# Patient Record
Sex: MALE
[De-identification: names, ages, dates, MRNs, and addresses within clinical notes are randomized per-mention and may not be internally consistent; named-entity substitution may affect disease eponyms.]

---

## 2022-11-17 ENCOUNTER — HOSPITAL ENCOUNTER (OUTPATIENT)
Dept: HOSPITAL 97 - ER | Age: 52
Setting detail: OBSERVATION
LOS: 2 days | Discharge: HOME | End: 2022-11-19
Attending: HOSPITALIST | Admitting: HOSPITALIST
Payer: COMMERCIAL

## 2022-11-17 VITALS — BODY MASS INDEX: 57.4 KG/M2

## 2022-11-17 DIAGNOSIS — E66.01: ICD-10-CM

## 2022-11-17 DIAGNOSIS — K60.4: Primary | ICD-10-CM

## 2022-11-17 DIAGNOSIS — Z20.822: ICD-10-CM

## 2022-11-17 DIAGNOSIS — Z88.6: ICD-10-CM

## 2022-11-17 DIAGNOSIS — E11.9: ICD-10-CM

## 2022-11-17 LAB
ALBUMIN SERPL BCP-MCNC: 3.6 G/DL (ref 3.4–5)
ALP SERPL-CCNC: 65 U/L (ref 45–117)
ALT SERPL W P-5'-P-CCNC: 31 U/L (ref 12–78)
AST SERPL W P-5'-P-CCNC: 17 U/L (ref 15–37)
BUN BLD-MCNC: 9 MG/DL (ref 7–18)
GLUCOSE SERPLBLD-MCNC: 100 MG/DL (ref 74–106)
HCT VFR BLD CALC: 42.6 % (ref 39.6–49)
INR BLD: 1.15
LYMPHOCYTES # SPEC AUTO: 1.6 K/UL (ref 0.7–4.9)
MCV RBC: 81.8 FL (ref 80–100)
PMV BLD: 8.7 FL (ref 7.6–11.3)
POTASSIUM SERPL-SCNC: 3.8 MMOL/L (ref 3.5–5.1)
RBC # BLD: 5.2 M/UL (ref 4.33–5.43)

## 2022-11-17 PROCEDURE — 87811 SARS-COV-2 COVID19 W/OPTIC: CPT

## 2022-11-17 PROCEDURE — 96361 HYDRATE IV INFUSION ADD-ON: CPT

## 2022-11-17 PROCEDURE — 82947 ASSAY GLUCOSE BLOOD QUANT: CPT

## 2022-11-17 PROCEDURE — 99283 EMERGENCY DEPT VISIT LOW MDM: CPT

## 2022-11-17 PROCEDURE — 80053 COMPREHEN METABOLIC PANEL: CPT

## 2022-11-17 PROCEDURE — 76882 US LMTD JT/FCL EVL NVASC XTR: CPT

## 2022-11-17 PROCEDURE — 84132 ASSAY OF SERUM POTASSIUM: CPT

## 2022-11-17 PROCEDURE — 85025 COMPLETE CBC W/AUTO DIFF WBC: CPT

## 2022-11-17 PROCEDURE — 80048 BASIC METABOLIC PNL TOTAL CA: CPT

## 2022-11-17 PROCEDURE — 72193 CT PELVIS W/DYE: CPT

## 2022-11-17 PROCEDURE — 36415 COLL VENOUS BLD VENIPUNCTURE: CPT

## 2022-11-17 PROCEDURE — 96375 TX/PRO/DX INJ NEW DRUG ADDON: CPT

## 2022-11-17 PROCEDURE — 85610 PROTHROMBIN TIME: CPT

## 2022-11-17 PROCEDURE — 84100 ASSAY OF PHOSPHORUS: CPT

## 2022-11-17 PROCEDURE — 96365 THER/PROPH/DIAG IV INF INIT: CPT

## 2022-11-17 PROCEDURE — 83735 ASSAY OF MAGNESIUM: CPT

## 2022-11-17 PROCEDURE — 46020 PLACEMENT OF SETON: CPT

## 2022-11-17 RX ADMIN — SODIUM CHLORIDE SCH MLS: 9 INJECTION, SOLUTION INTRAVENOUS at 23:00

## 2022-11-17 RX ADMIN — Medication SCH ML: at 21:00

## 2022-11-17 RX ADMIN — FENTANYL CITRATE PRN MCG: 50 INJECTION, SOLUTION INTRAMUSCULAR; INTRAVENOUS at 23:20

## 2022-11-17 RX ADMIN — HUMAN INSULIN SCH: 100 INJECTION, SOLUTION SUBCUTANEOUS at 21:00

## 2022-11-17 RX ADMIN — SODIUM CHLORIDE SCH MLS: 0.9 INJECTION, SOLUTION INTRAVENOUS at 20:20

## 2022-11-17 NOTE — RAD REPORT
EXAM DESCRIPTION:  US - Extremity Nonvascular Limited - 11/17/2022 2:57 pm

 

CLINICAL HISTORY:  R/O Perirectal abscess

 

COMPARISON:  No comparisons

 

FINDINGS:  Focused ultrasound of the perineum to evaluate for abscess.

 

Fluid collection in the perineum measuring approximately 5 mm x 4 mm. Skin thickening is also present
.

 

IMPRESSION:  Subcutaneous collection likely reflecting a subcentimeter abscess in the perineum. Peria
nal/perirectal fistula cannot be excluded on the basis of this study.

## 2022-11-17 NOTE — XMS REPORT
Continuity of Care Document

                          Created on:2022



Patient:MALISSA GUEVARA

Sex:Male

:1970

External Reference #:[id number]





Demographics







                          Address                   [address]



                                                    [place] [address]

 

                          Home Phone                [phone]

 

                          Work Phone                [phone]

 

                          Mobile Phone              [phone]

 

                          Email Address             [email or web address]

 

                          Preferred Language        English

 

                          Marital Status            Unknown

 

                          Christian Affiliation     Unknown

 

                          Race                      Unknown

 

                          Additional Race(s)        Unavailable



                                                    White

 

                          Ethnic Group              Unknown









Author







                          Organization              [facility]

t

 

                          Address                   [address]



                                                    [place] [address]

 

                          Phone                     [phone]









Support







                Name            Relationship    Address         Phone

 

                ALICIA GUEVARA              [address] [facility]     [phone]



                                                [place] [address] 

 

                ELVER GAYTAN SI              Unavailable     [phone]

 

                ANN MARIE VILLALPANDO, DARBY Emergency Provider [address] [facility]   [phone]

[phone]



                                                [place] [address] 

 

                FLAQUITA RAMÍREZ MD Primary Care Physician [address]     [phone]



                                                [place] [address] 

 

                PAOLA ALICIA    Next of Kin     [address]   [phone]



                                                [place] [address] 









Care Team Providers







                    Name                Role                Phone

 

                    Flaquita Ramírez MD Primary Care Physician [phone]

[phone]

 

                    Eliseo VILLALPANDO, Yesy Francois Attending Clinician [phone]

[phone]

 

                    Winnie VILLALPANDO, Zeynep SOARES Attending Clinician [phone]

 

                    Debra Almonte MA  Attending Clinician Unavailable

 

                    Zeus VILLALPANDO, Handy Ortiz Attending Clinician [phone]

 

                    Poncho VILLALPANDO, Hanna Rodriguez Attending Clinician [phone]

 

                    Lennox VILLALPANDO, Usama    Attending Clinician [phone]

 

                    Christos VILLALPANDO, Joselo Syed Attending Clinician [phone]

 

                    Adam VILLALPANDO, Jolly Christianson Attending Clinician [phone]

 

                    Flaquita Ramírez MD Attending Clinician [phone]

 

                    MENG BENITES      Attending Clinician Unavailable

 

                    KACEY CEDENO  Attending Clinician Unavailable

 

                    KARELY KOWALSKI         Attending Clinician Unavailable

 

                    MD KARELY KOWALSKI Attending Clinician Unavailable

 

                    BRENDA GARZA        Attending Clinician Unavailable

 

                    HANDY SCHULZ       Admitting Clinician Unavailable

 

                    ROSEMARIE BERRY         Admitting Clinician Unavailable

 

                    KARELY KOWALSKI         Admitting Clinician Unavailable

 

                    MD KARELY KOWALSKI Admitting Clinician Unavailable









Payers







           Payer Name Policy Type Policy Number Effective Date Expiration Date S

ource







Problems







       Condition Condition Condition Status Onset  Resolution Last   Treating Co

mments 

Source



       Name   Details Category        Date   Date   Treatment Clinician        



                                                 Date                 

 

       Trigger Trigger Disease Active                       Overview: [facility]

[facility]



       finger, finger,                                       Formattin st



       left ring left ring               00:00:                      g of this H

ospita



       finger finger               00                          note   l



                                                               might be 



                                                               different 



                                                               from the 



                                                               original. 



                                                               Added  



                                                               automatic 



                                                               ally from 



                                                               request 



                                                               for    



                                                               surgery 



                                                               [id number] 

 

       Atrial Atrial Disease Active                              [facility]



       fibrillati fibrillati               6-25                               st



       on with on with               00:00:                             Hospita



       rapid  rapid                00                                 l



       ventricula ventricula                                                  



       r response r response                                                  

 

       Shortness Shortness Disease Active                              Met

hodi



       of breath of breath               1-08                               st



                                   00:00:                             Hospita



                                   00                                 l

 

       Chest pain Chest pain Disease Active 2017                             M

ethodi



                                   2-03                               st



                                   00:00:                             Hospita



                                   00                                 l

 

       Acute back Acute back Disease Active 2016                             M

ethodi



       pain   pain                 0-03                               st



                                   00:00:                             Hospita



                                   00                                 l

 

       Spondyloli Spondyloli Disease Active 2016                             M

ethodi



       sthesis at sthesis at               0-03                               st



       L4-L5  L4-L5                00:00:                             Hospita



       level  level                00                                 l

 

       Disc   Disc   Disease Active 2016                             Methodi



       degenerati degenerati               0-03                               st



       on, lumbar on, lumbar               00:00:                             Ho

spita



                                   00                                 l







Allergies, Adverse Reactions, Alerts







       Allergy Allergy Status Severity Reaction(s) Onset  Inactive Treating Comm

ents 

Source



       Name   Type                        Date   Date   Clinician        

 

       Morphine Propensi Active        Shortness Of 2016               Chest  

Methodi



              ty to                Breath 0-03                 tightness st



              adverse                      00:00:               , SOB  Hospita



              reaction                      00                          l



              s to                                                    



              drug                                                    

 

       Tigecycl Propensi Active               2016               Muscle Method

i



       ine    ty to                       0-03                 spasms, st



              adverse                      00:00:               tremors Hospita



              reaction                      00                   and    l



              s to                                             vomiting 



              drug                                                    







Family History







           Family Member Diagnosis  Comments   Start Date Stop Date  Source

 

           Natural father Cancer                                      [facility] Diabetes                                    [facility]

 

           Natural father Kidney disease                                  [facility]

ist [facility] Stroke                                      [facility] mother [facility]                                  [facility]                                  [facility]

 

           Other      Diabetes                                    [facility]

ital







Social History







           Social Habit Start Date Stop Date  Quantity   Comments   Source

 

           Alcohol intake 2022-10-16 2022-10-16 Current               [facility]



                      00:00:00   00:00:00   non-drinker of            Hospital



                                            alcohol (finding)            

 

           Tobacco use and 2022 Smokeless tobacco            Me

[facility]



           exposure   00:00:00   00:00:00   non-user              Hospital

 

           Sex Assigned At [birth year]                       [facility]



           Birth      00:00:00   [facility]:00:00                         [facility]









                Smoking Status  Start Date      Stop Date       Source

 

                Never smoked tobacco                                 [facility]

[facility]







Medications







       Ordered Filled Start  Stop   Current Ordering Indication Dosage Frequency

 Signature

                    Comments            Components          Source



     Medication Medication Date Date Medication? Clinician                (SIG) 

          



     Name Name                                                   

 

     lidocaine      2022      Yes                 Q.5D Apply           [facility]i



     (XYLOCAINE)      0-16                               topically           st



     2 % jelly      00:00:                               2 (two)           Hospi

ta



               00                                 times a           l



                                                  day as           



                                                  needed for           



                                                  mild pain.           

 

     amoxicillin      2022- No             1{tbl} Q12H Take 1           M

ethodi



     -pot      0-16 10-24                          tablet by           st



     clavulanate      00:00: 04:59                          mouth           Hosp

jackie



     (AUGMENTIN)      00   :00                           every 12           l



     875-125 mg                                         (twelve)           



     per tablet                                         hours for           



                                                  7 days.           

 

     keTOROlac      2022- No             10mg Q6H  Take 1           [facility]etho

di



     (TORadol)      0-16 10-22                          tablet (10           st



     10 mg      00:00: 04:59                          mg total)           Hospit

a



     tablet      00   :00                           by mouth           l



                                                  every 6           



                                                  (six)           



                                                  hours as           



                                                  needed for           



                                                  moderate           



                                                  pain for           



                                                  up to 5           



                                                  days.           

 

     polyethylen      2022- No             17g  QD   Take 17 g           

Method[facility]



     e glycol      0-14 11-14                          by mouth           st



     (MIRALAX)      00:00: 05:59                          daily for           Ho

spita



     17 gram      00   :00                           30 days.           l



     packet                                                        

 

     lidocaine      2022- No             1{each} Q12H Apply 1           M

ethodi



     HCl-hydroco      0-14 11-14                          each           st



     rtison ac      00:00: 05:59                          topically           Ho

spita



     3-0.5 %      00   :00                           every 12           l



     cream                                         (twelve)           



                                                  hours as           



                                                  needed           



                                                  (rectal           



                                                  pain) for           



                                                  up to 30           



                                                  days.           

 

     hydrocortis      2022- No             1{appli Q.5D Insert 1         

  Methodi



     one-pramoxi      0-14 11-14                cator}      applicator          

 st



     ne        00:00: 05:59                          into the           Hospita



     (Proctofoam      00   :00                           rectum 2           l



     HC) 1-1 %                                         (two)           



     rectal foam                                         times a           



                                                  day for 30           



                                                  days.           

 

     polyethylen      2022- No             17g  QD   Take 17 g           

Methodi



     e glycol      0-14 10-14                          by mouth           st



     (MIRALAX)      00:00: 00:00                          daily for           Ho

spita



     17 gram      00   :00                           30 days.           l



     packet                                                        

 

     hydrocortis      2022- No             1{appli Q.5D Insert 1         

  Methodi



     one-pramoxi      0-14 10-14                cator}      applicator          

 st



     ne        00:00: 00:00                          into the           Hospita



     (Proctofoam      00   :00                           rectum 2           l



     HC) 1-1 %                                         (two)           



     rectal foam                                         times a           



                                                  day for 30           



                                                  days.           

 

     lidocaine      2022- No             1{each} Q12H Apply 1           M

ethodi



     HCl-hydroco      0-14 10-14                          each           st



     rtison ac      00:00: 00:00                          topically           Ho

spita



     3-0.5 %      00   :00                           every 12           l



     cream                                         (twelve)           



                                                  hours as           



                                                  needed           



                                                  (rectal           



                                                  pain).           

 

     potassium            Yes            10meq Q.5D Take 10           Meth

[facility]



     citrate      9-13                               mEq by           st



     (UROCIT-K)      09:32:                               mouth 2           Hosp

jackie



     10 mEq      45                                 (two)           l



     (1,080 mg)                                         times a           



     CR tablet                                         day.           

 

     semaglutide            Yes            1mg  Q1W  Inject 1           Me

thodi



     (OZEMPIC)      9-13                               mg under           st



     0.25      09:32:                               the skin           Hospita



     mg/dose (2      45                                 every 7           l



     mg/1.5 mL)                                         days.           



     subcutaneou                                         Every           



     s pen                                         Monday           

 

     pioglitazon            Yes            15mg QD   Take 15 mg           

Method[facility]



     e (ACTOS)      9-13                               by mouth           st



     15 MG      09:32:                               daily.           Hospita



     tablet      45                                                l

 

     aspirin      0      Yes            81mg QD   Take 81 mg           Meth

william



     (ECOTRIN)      9-13                               by mouth           st



     81 MG      09:32:                               daily.           Hospita



     enteric      45                                                l



     coated                                                        



     tablet                                                        

 

     famotidine            Yes            20mg Q.5D Take 20 mg           M

ethodi



     (PEPCID) 20                                     by mouth 2           st



     MG tablet      09:32:                               (two)           Hospita



               45                                 times a           l



                                                  day.           

 

     rosuvastati            Yes            5mg  QD   Take 1           Meth

william



     n (CRESTOR)                                     tablet (5           st



     5 mg tablet      09:32:                               mg total)           H

ospita



               45                                 by mouth           l



                                                  daily.           

 

     cyanocobala            Yes            1000ug QD   Take 1           Me

thodi



     min                                      tablet           st



     (VITAMIN      09:32:                               (1,000 mcg           Hos

lucretia



     B-12) 1000      45                                 total) by           l



     MCG tablet                                         mouth           



                                                  daily.           

 

     cholecalcif            Yes                      Take by           Met

hodi



     ayah,                                     mouth.           st



     vitamin D3,      09:32:                                              Hospit

a



     (VITAMIN D3      45                                                l



     ORAL)                                                        

 

     flecainide            Yes            100mg Q.5D Take 1           Meth

william



     (TAMBOCOR)                                     tablet           st



     100 MG      09:32:                               (100 mg           Hospita



     tablet      45                                 total) by           l



                                                  mouth 2           



                                                  (two)           



                                                  times a           



                                                  day.           

 

     methocarbam      2022- No             500mg Q.5D Take 500           

Methodi



     oL                                  mg by           st



     (ROBAXIN)      16:46: 00:00                          mouth 2           Hosp

jackie



     500 MG      52   :00                           (two)           l



     tablet                                         times a           



                                                  day.           

 

     pregabalin      2022- No                       pregabalin           

Methodi



     (LYRICA) 50                                50 mg           st



     MG capsule      16:19: 00:00                          capsule           Hos

lucretia



               10   :00                                          l

 

     HYDROcodone      2022- No             1{tbl} Q6H  Take 1           M

ethodi



     -acetaminop                                tablet by           st



     hen (NORCO)      16:19: 00:00                          mouth           Hosp

jackie



     7.5-325 mg      00   :00                           every 6           l



     per tablet                                         (six)           



                                                  hours as           



                                                  needed for           



                                                  moderate           



                                                  pain.           

 

     traMADoL      2022- No        630[address] 50mg Q6H  Take 1           Metho

di



     (ULTRAM) 50      9-09 09-15                          tablet (50           s

t



     mg tablet      00:00: 04:59                          mg total)           Ho

spita



               00   :00                           by mouth           l



                                                  every 6           



                                                  (six)           



                                                  hours as           



                                                  needed for           



                                                  moderate           



                                                  pain for           



                                                  up to 5           



                                                  days           



                                                  .acute           



                                                  pain.           

 

     ondansetron      2022- No             4mg  Q8H  Take 1           Met

hodi



     ODT                                 tablet (4           st



     (ZOFRAN-ODT      00:00: 00:00                          mg total)           

Hospita



     ) 4 MG      00   :00                           by mouth           l



     disintegrat                                         every 8           



     ing tablet                                         (eight)           



                                                  hours as           



                                                  needed for           



                                                  nausea or           



                                                  vomiting           



                                                  for up to           



                                                  12 doses.           

 

     tamsulosin      2022- No             .4mg QD   Take 1           Meth

william



     (FLOMAX)                                capsule           st



     0.4 mg      00:00: 04:59                          (0.4 mg           Hospita



     capsule      00   :00                           total) by           l



                                                  mouth           



                                                  daily for           



                                                  30 days.           

 

     keTOROlac      2022- No             10mg Q6H  Take 1           [facility]

di



     (TORadol)                                tablet (10           st



     10 mg      00:00: 04:59                          mg total)           Hospit

a



     tablet      00   :00                           by mouth           l



                                                  every 6           



                                                  (six)           



                                                  hours as           



                                                  needed for           



                                                  moderate           



                                                  pain for           



                                                  up to 5           



                                                  days.           

 

     SUMAtriptan       No             50mg      Take 1           Met

hodi



     (Imitrex)                                tablet (50           st



     50 MG      00:00: 00:00                          mg total)           Hospit

a



     tablet      00   :00                           by mouth           l



                                                  once as           



                                                  needed for           



                                                  migraine           



                                                  for up to           



                                                  8 doses.           



                                                  May repeat           



                                                  in 2 hours           



                                                  if             



                                                  unresolved           



                                                  . Do not           



                                                  exceed 200           



                                                  mg in 24           



                                                  hours.           

 

     allopurinol            Yes            300mg QD   Take 300           M

ethodi



     (ZYLOPRIM)      4-01                               mg by           st



     300 MG      00:00:                               mouth           Hospita



     tablet      00                                 daily.           l

 

     metFORMIN      2016      Yes            500mg Q.5D Take 500           Met

hodi



     (GLUCOPHAGE      0-02                               mg by           st



     ) 500 MG      00:00:                               mouth 2           Hospit

a



     tablet      00                                 (two)           l



                                                  times a           



                                                  day.           







Immunizations







           Ordered Immunization Filled Immunization Date       Status     Commen

ts   Source



           Name       Name                                        

 

           PFIZER READY TO USE            2022 Completed             [facility]

dist



           COVID-19 MRNA            00:00:00                         Hospital



           VACCINATION                                             

 

           PFIZER COVID-19 MRNA            2021 Completed             [facility]

odist



           VACCINATION            00:00:00                         [facility]

 

           PFIZER COVID-19 MRNA            2021 Completed             [facility]

odist



           VACCINATION            00:00:00                         Hospital

 

           PFIZER COVID-19 MRNA            2021 Completed             [facility]

odist



           VACCINATION            00:00:00                         Hospital

 

           Influenza,            2017 Completed             [facility]



           Unspecified            00:00:00                         Hospital







Vital Signs







             Vital Name   Observation Time Observation Value Comments     Source

 

             Systolic blood 2022-10-16 18:36:17 120 mm[Hg]                [facility]



             pressure                                            

 

             Diastolic blood 2022-10-16 18:36:17 58 mm[Hg]                 [facility]



             pressure                                            

 

             Heart rate   2022-10-16 18:36:17 57 /min                   [facility]

 

             Respiratory rate 2022-10-16 18:36:17 26 /min                   [facility]

 

             Oxygen saturation in 2022-10-16 18:36:17 100 /min                  

[facility]



             Arterial blood by                                        



             Pulse oximetry                                        

 

             Body temperature 2022-10-16 16:08:13 37 Bianka                    [facility]

 

             Body height  2022-10-14 20:06:00 180.3 cm                  [facility]

 

             Body weight  2022-10-14 20:06:00 191.418 kg                [facility]

 

             BMI          2022-10-14 20:06:00 58.86 kg/m2               [facility]







Procedures







                Procedure       Date / Time     Performing Clinician Source



                                Performed                       

 

                ECG 12-LEAD     2022-10-16 17:38:38 Yesy Landa [facility]

ospital



                                                [place]         

 

                ECG ED PRELIMINARY 2022-10-16 [facility]:09[facility]04 [facility]



                INTERPRETATION                  [place]         

 

                ED REFERRAL TO [place] 2022-10-14 22:50:03 Elizabeth Finch 

[facility] PHYSICIAN                 [facility]          



                ORGANIZATION (PCP)                                 

 

                CT ABDOMEN PELVIS W 2022-10-14 22:29:22 Elizabeth Finch [facility]



                CONTRAST                        [facility]          

 

                HC COMPLETE BLD COUNT 2022-10-14 21:02:00 Elizabeth Finch [facility]



                W/AUTO DIFF                     [facility]          

 

                COMPREHENSIVE METABOLIC 2022-10-14 21:02:00 Elizabeth Finch

 [facility]



                PANEL                           [facility]          

 

                LACTIC ACID LEVEL, SEPSIS 2022-10-14 21:02:00 Elizabeth Finch se [facility]



                - NOW AND REPEAT 2X EVERY                 [facility]          



                3 HOURS                                         

 

                ESTIMATED GFR   2022-10-14 21:02:00 Elizabeth Finch [facility]



                                                [facility]          

 

                POC GLUCOSE     2022 13:23:00 Handy Schulz [facility]

 

                KS AN ELECTIVE  2022 12:40:00 Chilo Tyler [facility]

ospital



                SUPRAGLOTTIC AIRWAY                                 

 

                RELEASE, TRIGGER FINGER 2022 12:28:00 [facility]

 

                POC GLUCOSE     2022 11:39:00 [facility]

 

                ECG PRE/POST OP 2022 21:29:04 [facility]

ospital

 

                HC COMPLETE BLD COUNT 2022 21:06:00 [facility]



                W/AUTO DIFF                                     

 

                BASIC METABOLIC PANEL 2022 21:06:00 [facility]

 

                HEMOGLOBIN A1C  2022 21:06:00 [facility]

ospital

 

                ESTIMATED GFR   2022 21:06:00 [facility]

ospital

 

                KS INJECT TENDON 2022 14:58:36 [facility]



                SHEATH/LIGAMENT                                 

 

                CT RENAL STONE PROTOCOL 2022 11:10:10 Kin Alcaraz [facility]

 

                URINE CULTURE   2022 10:44:00 AlcarazKin verduzco [facility]

 

                URINALYSIS SCREEN AND 2022 10:44:00 [facility]



                MICROSCOPY, WITH REFLEX                                 



                TO CULTURE                                      

 

                CT HEAD WO CONTRAST 2022 14:28:35 Jolly Medina [facility]

 

                COMPREHENSIVE METABOLIC 2022 14:08:00 Jolly aguilar

[facility]



                PANEL                                           

 

                LIPASE LEVEL    2022 14:08:00 jeff Jolly [facility]

 

                PHOSPHORUS LEVEL 2022 14:08:00 [facility]

 

                MAGNESIUM LEVEL 2022 14:08:00 [facility]

 

                HC COMPLETE BLD COUNT 2022 14:08:00 [facility] Jolly Sammy 

[facility]



                W/AUTO DIFF                                     

 

                ESTIMATED GFR   2022 14:08:00 [facility]







Plan of Care







             Planned Activity Planned Date Details      Comments     Source

 

             Future Scheduled 2022   HEPATITIS B VACCINES              [facility]



             Test         15:27:41     (1 of 3 - 3-dose              



                                       series) [code =              



                                       HEPATITIS B VACCINES              



                                       (1 of 3 - 3-dose              



                                       series)]                  

 

             Future Scheduled 2022   Hepatitis C screening              [facility]



             Test         15:27:41     (procedure) [code =              



                                       427105071]                

 

             Future Scheduled 2022   COLONOSCOPY SCREENING              [facility]



             Test         15:27:41     [code = COLONOSCOPY              



                                       SCREENING]                

 

             Future Scheduled 2022   COVID-19 VACCINE (5 -              [facility]



             Test         15:27:41     Booster for Pfizer              



                                       series) [code =              



                                       COVID-19 VACCINE (5 -              



                                       Booster for Pfizer              



                                       series)]                  







Encounters







        Start   End     Encounter Admission Attending Care    Care    Encounter 

Source



        Date/Time Date/Time Type    Type    Clinicians Facility Department ID   

   

 

        2022-10-16 2022-10-16 Emergency         Landa, 1.2.840.1 [id number]

[id number] Method[facility]



        11:58:00 14:17:00                 Yesy 25614.1.1         [address]     [address]



                                        Alessandro 3.430.2.7                 Hospit

a



                                                .3.072668                 l



                                                .8                      

 

        2022-10-16 2022-10-16 Travel                  1.2.840.1 1.2.642.929 [id number]

[id number] Method[facility]



        00:00:00 00:00:00                         93056.1.1 350.1.13.43 837     

st



                                                3.430.2.7 0.2.7.3.698         

spita



                                                .3.647954 084.8           l



                                                .8                      

 

        2022-10-16 2022-10-16 [facility]         [facility]     [facility]     [id number]

[address] [place]



        00:00:00 00:00:00                 YESY                 969     [facility]

di



                                                                        st

 

        2022-10-14 2022-10-14 Emergency         Winnie, 1.2.840.1 [id number]

[id number] Methodi



        15:40:00 18:32:00                 Zeynep Maciel50.1.1         744     st



                                                3.430.2.7                 Hospit

a



                                                .3.530044                 l



                                                .8                      

 

        2022-10-14 2022-10-14 Emergency         [place] [address]     [id number]

[address] [place]



        00:00:00 00:00:00                 ZEYNEP                  744     Method

i



                                                                        st

 

        2022-10-06 2022-10-06 Orders          Gage, 1.2.840.1 [id number]

[id number] Methodi



        00:00:00 00:00:00 Only            Debra    97103.1.1         005     st



                                                3.430.2.7                 Hospit

a



                                                .3.532224                 l



                                                .8                      

 

        2022 Office          Schulz,   1.2.840.1 [id number]

3084 [facility]



        16:10:00 17:19:28 Visit           Vinceliane 54670.1.1         [address]     [address]



                                        Angel    3.430.2.7                 Hospit

a



                                                .3.135568                 l



                                                .8                      

 

        2022 Outpatient         SCHULZ,   [facility]     [id number]

[address] [place]



        00:00:00 00:00:00                 SHERRIELIANE                 612     Method

i



                                                                        st

 

        2022 Travel                  1.2.840.1 1.2.368.206 6660

[id number] Methodi



        00:00:00 00:00:00                         10795.1.1 350.1.13.43 486     

st



                                                3.430.2.7 0.2.7.3.698         Ho

spita



                                                .3.957379 084.8           l



                                                .8                      

 

        2022 Hospital         Schulz,   1.2.840.1 [id number]

93615 Method[facility]



        05:51:00 09:32:00 Encounter         Vincent 64817.1.1         [address]     st



                                        Angel    3.430.2.7                 Hospit

a



                                                .3.949209                 l



                                                .8                      

 

        2022 Surgery         Schulz,   1.2.840.1 [id number]

3087 [facility]



        07:30:00 08:40:00                 Vincent 93285.1.1         795     st



                                        Angel    3.430.2.7                 Hospit

a



                                                .3.588419                 l



                                                .8                      

 

        2022 Anesthesia         Isaac,   1.2.840.1 [id number]

[id number] Methodi



        07:28:00 08:23:00 Event           Yaoyao  04626.1.1         009     st



                                        Jennifer  3.430.2.7                 Hospit

a



                                                .3.245195                 l



                                                .8                      

 

        2022 Outpatient         SCHULZ,   [facility]     021     [id number]

[address] [place]



        00:00:00 00:00:00                 VINCENT                 798     Method

i



                                                                        st

 

        2022 Travel                  1.2.840.1 1.2.009.093 5086

[id number] Methodi



        00:00:00 00:00:00                         36251.1.1 350.1.13.43 397     

st



                                                3.430.2.7 0.2.7.3.698         Ho

spita



                                                .3.241590 084.8           l



                                                .8                      

 

        2022 Pre-Admiss         Schulz,   1.2.840.1 [id number] 

[id number] Methodi



        16:00:00 17:00:00 ion             Vincent 59187.1.1         786     st



                        Testing         Angel    3.430.2.7                 Hospit

a



                                                .3.579801                 l



                                                .8                      

 

        2022 Office          Schulz,   1.2.840.1 [id number]

2421 Method[facility]



        15:00:00 15:42:58 Visit           Vinceliane 92823.1.1         140     st



                                        Angel    3.430.2.7                 Hospit

a



                                                .3.017272                 l



                                                .8                      

 

        2022 Outpatient         SCHULZ,   [facility]     [id number]

[address] [place]



        00:00:00 00:00:00                 VINCENT                 140     Method

i



                                                                        st

 

        2022 Outpatient         SCHULZ,   [facility]     [id number]

[address] [place]



        00:00:00 00:00:00                 VINCENT                 786     Method

i



                                                                        st

 

        2022 Transcribe         Schulz,   1.2.840.1 [id number]

[id number] Methodi



        00:00:00 00:00:00 Orders          Vinceliane 87957.1.1         676     st



                                        Angel    3.430.2.7                 Hospit

a



                                                .3.868340                 l



                                                .8                      

 

        2022 Travel                  1.2.840.1 1.2.466.646 3697

[id number] Methodi



        00:00:00 00:00:00                         47120.1.1 350.1.13.43 188     

st



                                                3.430.2.7 0.2.7.3.698         Ho

spita



                                                .3.288155 084.8           l



                                                .8                      

 

        2022 Travel                  1.2.840.1 1.2.964.310 6892

014723 Method[facility]



        00:00:00 00:00:00                         64600.1.1 350.1.13.43 134     

st



                                                3.430.2.7 0.2.7.3.698         Ho

spita



                                                .3.035969 084.8           l



                                                .8                      

 

        2022 Office          Schulz,   1.2.840.1 [id number]

6212 [facility]



        09:20:00 19:29:27 Visit           Vinceliane 53653.1.1         793     st



                                        Angel    3.430.2.7                 Hospit

a



                                                .3.980247                 l



                                                .8                      

 

        2022 Transcribe         Lennox, 1.2.840.1 [id number]

[id number] Methodi



        00:00:00 00:00:00 Orders          Irfan   87704.1.1         003     st



                                                3.430.2.7                 Hospit

a



                                                .3.185381                 l



                                                .8                      

 

        2022 Outpatient         SCHULZ,   [facility]     [id number]

[address] [place]



        00:00:00 00:00:00                 VINCENT                 793     Method

i



                                                                        st

 

        2022 Travel                  1.2.840.1 1.2.297.900 9067

[id number] Methodi



        00:00:00 00:00:00                         37026.1.1 350.1.13.43 726     

st



                                                3.430.2.7 0.2.7.3.698         Ho

spita



                                                .3.770559 084.8           l



                                                .8                      

 

        2022 Travel                  1.2.840.1 1.2.426.600 6730

[id number] Methodi



        00:00:00 00:00:00                         96573.1.1 350.1.13.43 631     

st



                                                3.430.2.7 0.2.7.3.698         Ho

spita



                                                .3.505313 084.8           l



                                                .8                      

 

        2022 Emergency         Christos, 1.2.840.1 [id number]

[id number] 

Methodi



        05:25:00 07:20:00                 Joselo Syed 13525.1.1         536     

st



                                                3.430.2.7                 Hospit

a



                                                .3.348819                 l



                                                .8                      

 

        2022 Emergency         CHRISTOS, [place] [address]     [id number]

[address] [place]



        00:00:00 00:00:00                 JOSELO                   536     Method

i



                                                                        st

 

        2022 Travel                  1.2.840.1 1.2.008.265 8571

[id number] Methodi



        00:00:00 00:00:00                         02475.1.1 350.1.13.43 617     

st



                                                3.430.2.7 0.2.7.3.698         Ho

spita



                                                .3.981132 084.8           l



                                                .8                      

 

        2022 Emergency         Egbers, 1.2.840.1 [id number]

[id number] Methodi



        07:45:00 09:33:00                 Jolly 81982.1.1         308     st



                                        Sammy   3.430.2.7                 Hospit

a



                                                .3.313585                 l



                                                .8                      

 

        2022 Emergency         EGBERS, [place] [address]     [id number]

[address] [place]



        00:00:00 00:00:00                 JOLLY                 308     Method

i



                                                                        st

 

        2022 Transcribe         Neret,  1.2.840.1 [id number]

[id number] Methodi



        00:00:00 00:00:00 Orders          Flaquita 79860.1.1         314     st



                                        Cristo  3.430.2.7                 Hospit

a



                                                .3.625617                 l



                                                .8                      

 

        2021 Outpatient         SCHULZ,   [facility]     [id number]

[address] [place]



        00:00:00 00:00:00                 HANDY                 839     Method

i



                                                                        st

 

        2021 Emergency         DELMIS, [facility]     064     [id number]

[address] [place]



        00:00:00 00:00:00                 MENG Perez     Method

i



                                                                        st

 

        2021 Outpatient         WILIAN [facility]     064     [id number]

[id number] [place]



        00:00:00 00:00:00                 , KACEY                 575     Method

i



                                                                        st

 

        2021 Outpatient         ZEUS,   [facility] [facility]     [id number]

[address] [place]



        00:00:00 00:00:00                 HANDY                 326     Method

i



                                                                        st

 

        2021 2021-02-10 Outpatient         KARELY KOWALSKI [facility]     064     210

[id number] [place]



        00:00:00 00:00:00                                         112     Method

i



                                                                        st

 

        2020 Emergency E       BRENDA GARZA [facility]    [facility]    7503 

   [facility]



        09:46:00 14:07:00                                                 

 

        2020 Outpatient E               [facility]    MED     7502   

 [facility]



        23:02:00 23:02:00                                                 

 

        2020 Emergency E               [facility]    [facility]    7501    

[facility]



        07:39:00 07:39:00                                                 

 

        2019 Emergency E               [facility]    [facility]    7500    

[facility]



        16:21:00 16:21:00                                                 







Results







           Test Description Test Time  Test Comments Results    Result Comments 

Source









                    ECG 12 lead         2022-10-17 14:55:48 









                      Test Item  Value      Reference Range Interpretation Comme

nts









             Ventricular rate (test code = 253)                                 

       

 

             Atrial rate (test code = 255)                                      

  

 

             KS interval (test code = 266)                                      

  

 

             QRSD interval (test code = 260)                                    

    

 

             QT interval (test code = 264)                                      

  

 

             QTC interval (test code = 265)                                     

   

 

             P axis 1 (test code = 267)                                        

 

             QRS axis 1 (test code = 268)                                       

 

 

             T wave axis (test code = 270)                                      

  

 

             EKG impression (test code = 273) Normal sinus rhythm-Minimal voltag

e criteria                           



                          for LVH, may be normal variant ( R in aVL             

              



                          )-Nonspecific ST and T wave                           



                          abnormality-Abnormal ECG-In automated                 

          



                          comparison with ECG of 09-SEP-2022                    

       



                          16:29,-Incomplete right bundle branch block           

                



                          is no longer present-Electronically Signed            

               



                          By Sean Gavin MD () on 10/17/2022              

             



                          9:55:44 AM                             



[facility] [id number] 13:24:00





             Test Item    Value        Reference Range Interpretation Comments

 

             POC glucose (test code = 103 mg/dL    65-99        H            Ope

rator Name:



             [id number])                                            Rogelio Ramirez

evice



                                                                 ID:



                                                                 [id number]

able:



                                                                 RN Notified

 

             Lab Interpretation (test Abnormal                               



             code = 71853-4)                                        



[facility] [id number] 14:57:51





             Test Item    Value        Reference Range Interpretation Comments

 

             Ventricular rate (test                                        



             code = 253)                                         

 

             Atrial rate (test code                                        



             = 255)                                              

 

             KS interval (test code                                        



             = 266)                                              

 

             QRSD interval (test                                        



             code = 260)                                         

 

             QT interval (test code                                        



             = 264)                                              

 

             QTC interval (test code                                        



             = 265)                                              

 

             P axis 1 (test code =                                        



             267)                                                

 

             QRS axis 1 (test code =                                        



             268)                                                

 

             T wave axis (test code                                        



             = 270)                                              

 

             EKG impression (test Normal sinus                           



             code = 273)  rhythm-Incomplete                           



                          right bundle branch                           



                          block-Moderate voltage                           



                          criteria for LVH, may                           



                          be normal                              



                          variant-Nonspecific ST                           



                          and T wave                             



                          abnormality-Abnormal                           



                          ECG-In automated                           



                          comparison with ECG of                           



                          01-AUG-2021                            



                          23:56,-Incomplete                           



                          right bundle branch                           



                          block is now                           



                          present-Electronically                           



                          Signed By Sean Gavin MD () on                           



                          2022 9:57:50 AM                           



[facility] [id number] 11:14:00





             Test Item    Value        Reference Range Interpretation Comments

 

             Urine culture (test SEE COMMENT                            Bacteriu

bhaskar screen



             code = 2258010)                                        negative.



[facility]Presence] in Respiratory specimen 
by JOE with probe [id number] 01:07:17





             Test Item    Value        Reference Range Interpretation Comments

 

             SARS-CoV-2 (COVID-19) RNA Not detected Not-Detected              



             [Presence] in Respiratory                                        



             specimen by JOE with probe                                        



             detection (test code = 90431-8)                                    

    



[facility]

## 2022-11-17 NOTE — ER
Nurse's Notes                                                                                     

 [facility]                                                                 

Name: Michael Dhillon                                                                                

Age: 52 yrs                                                                                       

Sex: Male                                                                                         

: 1970                                                                                   

MRN: [id number]                                                                                   

Arrival Date: 2022                                                                          

Time: 12:58                                                                                       

Account#: [id number]                                                                            

Bed 23                                                                                            

Private MD:                                                                                       

Diagnosis: Rectal abscess                                                                         

                                                                                                  

Presentation:                                                                                     

                                                                                             

13:15 Chief complaint: Patient states: sent here from [facility], told he has a abscess in       kr3 

      rectum. Coronavirus screen: Vaccine status: Patient reports receiving the 2nd dose of       

      the covid vaccine. Client denies travel out of the [place] in the last 14 days. Ebola          

      Screen: Patient denies travel to an Ebola-affected area in the 21 days before illness       

      onset.                                                                                      

13:15 Method Of Arrival: Ambulatory                                                           kr3 

13:17 Initial Sepsis Screen: Does the patient meet any 2 criteria? No. Patient's initial      kr3 

      sepsis screen is negative. Does the patient have a suspected source of infection? No.       

      Patient's initial sepsis screen is negative. Risk Assessment: Do you want to hurt           

      yourself or someone else? Patient reports no desire to harm self or others. Onset of        

      symptoms.                                                                                   

13:17 Acuity: MOLINA 3                                                                           kr3 

                                                                                                  

Triage Assessment:                                                                                

13:21 General: Appears in no apparent distress. uncomfortable, Behavior is calm, cooperative, kr3 

      appropriate for age. Pain: Complains of pain in buttocks.                                   

                                                                                                  

Historical:                                                                                       

- Allergies:                                                                                      

13:20 Morphine;                                                                               kr3 

13:20 tygasil;                                                                                kr3 

- PMHx:                                                                                           

13:19 Diabetes mellitus; sleep apnea; Atrial fibrillation;                                    kr3 

- PSHx:                                                                                           

13:20 trigger finger; Cholecystectomy;                                                        kr3 

                                                                                                  

- Immunization history:: Adult Immunizations up to date.                                          

- Social history:: Smoking status: Patient denies any tobacco usage or history of.                

                                                                                                  

                                                                                                  

Screenin:42 Abuse screen: Denies threats or abuse. Denies injuries from another. Nutritional        kc6 

      screening: No deficits noted. Tuberculosis screening: No symptoms or risk factors           

      identified. Fall Risk No fall in past 12 months (0 pts). No secondary diagnosis (0          

      pts). IV access (20 points). Ambulatory Aid- None/Bed Rest/Nurse Assist (0 pts). Gait-      

      Normal/Bed Rest/Wheelchair (0 pts) Mental Status- Oriented to own ability (0 pts).          

      Total Villalta Fall Scale indicates No Risk (0-24 pts).                                        

                                                                                                  

Assessment:                                                                                       

14:33 General: Appears in no apparent distress. uncomfortable, Behavior is calm, cooperative, kc6 

      appropriate for age. Pain: Complains of pain in rectum Pain does not radiate. Pain          

      currently is 10 out of 10 on a pain scale. Quality of pain is described as sharp, Pain      

      began gradually, Is continuous, Alleviated by nothing. Aggravated by having a bowel         

      movement Noted to be grimacing, Also complains of no other associated symptoms. Neuro:      

      Moran Agitation-Sedation Scale (RASS): 0 - Alert and Calm Level of Consciousness is      

      awake, alert, obeys commands, Oriented to person, place, time, situation, Appropriate       

      for age. Cardiovascular: Heart tones S1 S2 present Capillary refill < 3 seconds.            

      Respiratory: Airway is patent Trachea midline Respiratory effort is even, unlabored,        

      Respiratory pattern is regular, symmetrical, Breath sounds are clear bilaterally. GI:       

      Reports having an abscess to the rectum that has some serosanguinous drainage. : No       

      signs and/or symptoms were reported regarding the genitourinary system. EENT: No signs      

      and/or symptoms were reported regarding the EENT system. Derm: No signs and/or symptoms     

      reported regarding the dermatologic system. Skin is intact, Skin is pink, warm \T\ dry.     

      Musculoskeletal: No signs and/or symptoms reported regarding the musculoskeletal            

      system. Circulation, motion, and sensation intact. Capillary refill < 3 seconds, Range      

      of motion: intact in all extremities.                                                       

15:58 Reassessment: Patient appears in no apparent distress at this time. No changes from     kc6 

      previously documented assessment. Patient and/or family updated on plan of care and         

      expected duration. Pain level reassessed. Patient is alert, oriented x 3, equal             

      unlabored respirations, skin warm/dry/pink. client stated his pain is decreased to a        

      3/10.                                                                                       

16:58 Reassessment: Patient appears in no apparent distress at this time. No changes from     kc6 

      previously documented assessment. Patient and/or family updated on plan of care and         

      expected duration. Pain level reassessed. Patient is alert, oriented x 3, equal             

      unlabored respirations, skin warm/dry/pink.                                                 

                                                                                                  

Vital Signs:                                                                                      

13:17  / 77; Pulse 55; Resp 18; Temp 98.1; Weight 186.88 kg; Height 5 ft. 11 in.        kr3 

      (180.34 cm); Pain 10/10;                                                                    

14:41  / 65; Pulse 52; Resp 18 S; Pulse Ox 100% on R/A; Pain 10/10;                     kc6 

15:58  / 67; Pulse 55; Resp 17 S; Pulse Ox 100% on R/A; Pain 3/10;                      kc6 

16:58  / 56; Pulse 54; Resp 18; Pulse Ox 100% on R/A;                                   kc6 

13:17 Body Mass Index 57.46 (186.88 kg, 180.34 cm)                                            kr3 

                                                                                                  

ED Course:                                                                                        

12:58 Patient arrived in ED.                                                                  mr  

13:19 Triage completed.                                                                       kr3 

13:21 Arm band placed on right wrist.                                                         kr3 

13:36 Yesy Eagle FNP is PHCP.                                                          jh7 

13:36 Lukas Torre MD is Attending Physician.                                             jh7 

14:08 Patient placed in an exam room, on a stretcher.                                         ll1 

14:24 Elma Ni RN is Primary Nurse.                                                 kc6 

14:24 CBC with Diff Sent.                                                                     kc6 

14:24 CMP Sent.                                                                               kc6 

14:59 US Extrmty Nonvasular Limited In Process Unspecified.                                   EDMS

16:43 Sushant Kuhn is Hospitalizing Provider.                                               jh7 

18:09 SARS RAPID Sent.                                                                        kc6 

19:09 Primary Nurse role handed off by Elma Ni RN                                  mw2 

22:54 Joshua Downey, RN is Primary Nurse.                                                    3 

                                                                                             

08:37 Primary Nurse role handed off by Joshua Downey RN                                     eb  

                                                                                                  

Administered Medications:                                                                         

                                                                                             

14:32 Drug: NS 0.9% 1000 ml Route: IV; Rate: 1 bolus; Site: right antecubital;                kc6 

15:32 Follow up: Response: No adverse reaction; IV Status: Completed infusion; IV Intake:     kc6 

      1000ml                                                                                      

15:13 Drug: fentaNYL (PF) 50 mcg Route: IVP; Site: right antecubital;                         kc6 

16:13 Follow up: Response: No adverse reaction; Pain is decreased                             kc6 

15:13 Drug: Zofran (Ondansetron) 4 mg Route: IVP; Site: right antecubital;                    kc6 

16:13 Follow up: Response: No adverse reaction; Nausea is decreased                           kc6 

15:57 Drug: Zosyn (piperacillin-tazobactam) 3.375 grams Route: IVPB; Infused Over: 60 mins;   kc6 

      Site: right antecubital;                                                                    

16:57 Follow up: Response: No adverse reaction; IV Status: Completed infusion; IV Intake:     kc6 

      100ml                                                                                       

                                                                                                  

                                                                                                  

Medication:                                                                                       

23:23 VIS not applicable for this client.                                                     ll3 

                                                                                                  

Intake:                                                                                           

15:32 IV: 1000ml; Total: 1000ml.                                                              kc6 

16:57 IV: 100ml; Total: 1100ml.                                                               kc6 

                                                                                                  

Outcome:                                                                                          

16:44 Decision to Hospitalize by Provider.                                                    bettina 

                                                                                             

14:05 Patient left the ED.                                                                    ss  

                                                                                                  

Signatures:                                                                                       

Dispatcher [employer]                           EDMS                                                 

Nisreen ElizaldeTri, RN                      RN   ss                                                   

Renetta Glover                            2                                                  

Lety Hightower Lynsay RN                       RN   ll1                                                  

Joshua Downey, RN                      RN   ll3                                                  

Yesy Eagle, FNP                   FNP  7                                                  

Maya Richey RN                        RN   gordo3                                                  

Elma Ni RN                   RN   kc6                                                  

                                                                                                  

Corrections: (The following items were deleted from the chart)                                    

                                                                                             

16:01 15:58 Reassessment: Patient appears in no apparent distress at this time. No changes    kc6 

      from previously documented assessment. Patient and/or family updated on plan of care        

      and expected duration. Pain level reassessed. Patient is alert, oriented x 3, equal         

      unlabored respirations, skin warm/dry/pink. client stated his pain is decreased to a        

      3/10. kc6                                                                                   

                                                                                                  

**************************************************************************************************

## 2022-11-17 NOTE — EDPHYS
Physician Documentation                                                                           

 [facility]                                                                 

Name: Michael Dhillon                                                                                

Age: 52 yrs                                                                                       

Sex: Male                                                                                         

: 1970                                                                                   

MRN: [id number]                                                                                   

Arrival Date: 2022                                                                          

Time: 12:58                                                                                       

Account#: [id number]                                                                            

Bed 23                                                                                            

Private MD:                                                                                       

ED Physician Lukas Torre                                                                      

HPI:                                                                                              

                                                                                             

13:20 This 52 yrs old  Male presents to ER via Ambulatory with complaints of Doctor   7 

      Referral.                                                                                   

13:20 Onset: The symptoms/episode began/occurred 1 week(s) ago. Associated signs and          7 

      symptoms: Pertinent positives: Purulent drainage and bleeding from rectum, Pertinent        

      negatives: fever. Patient reports that he was sent from Dr. Singleton's office for a CT       

      scan. Reports that he was sent to get a perirectal abscess ruled out. States that he        

      has had rectal bleeding and pus seeping from his rectum for 1 month..                       

                                                                                                  

Historical:                                                                                       

- Allergies:                                                                                      

13:20 Morphine;                                                                               kr3 

13:20 tygasil;                                                                                kr3 

- PMHx:                                                                                           

13:19 Diabetes mellitus; sleep apnea; Atrial fibrillation;                                    kr3 

- PSHx:                                                                                           

13:20 trigger finger; Cholecystectomy;                                                        kr3 

                                                                                                  

- Immunization history:: Adult Immunizations up to date.                                          

- Social history:: Smoking status: Patient denies any tobacco usage or history of.                

                                                                                                  

                                                                                                  

ROS:                                                                                              

13:20 Constitutional: Negative for fever, chills, and weight loss, Eyes: Negative for injury, jh7 

      pain, redness, and discharge, Neck: Negative for injury, pain, and swelling,                

      Cardiovascular: Negative for chest pain, palpitations, and edema, Respiratory: Negative     

      for shortness of breath, cough, wheezing, and pleuritic chest pain, Back: Negative for      

      injury and pain, MS/Extremity: Negative for injury and deformity, Skin: Negative for        

      injury, rash, and discoloration, Neuro: Negative for headache, weakness, numbness,          

      tingling, and seizure.                                                                      

13:20 Abdomen/GI: Positive for rectal pain, rectal bleeding, Negative for nausea, vomiting,       

      and diarrhea.                                                                               

13:20 All other systems are negative.                                                             

                                                                                                  

Exam:                                                                                             

13:20 Constitutional:  This is a well developed, well nourished patient who is awake, alert,  jh7 

      and in no acute distress. Head/Face:  Normocephalic, atraumatic. Cardiovascular:            

      Regular rate and rhythm with a normal S1 and S2.  No gallops, murmurs, or rubs.  Normal     

      PMI, no JVD.  No pulse deficits. Respiratory:  Lungs have equal breath sounds               

      bilaterally, clear to auscultation and percussion.  No rales, rhonchi or wheezes noted.     

       No increased work of breathing, no retractions or nasal flaring. Back:  No spinal          

      tenderness.  No costovertebral tenderness.  Full range of motion. Skin:  Warm, dry with     

      normal turgor.  Normal color with no rashes, no lesions, and no evidence of cellulitis.     

      MS/ Extremity:  Pulses equal, no cyanosis.  Neurovascular intact.  Full, normal range       

      of motion. Neuro:  Awake and alert, GCS 15, oriented to person, place, time, and            

      situation. Motor strength 5/5 in all extremities.  Sensory grossly intact.  Cerebellar      

      exam normal.  Normal gait.                                                                  

13:20 Abdomen/GI: Inspection: small tear present in the perianal region superior to the anus      

      with purulent fluid actively drainage, Bowel sounds: normal, Palpation: abdomen is soft     

      and non-tender, Rectal exam: tenderness, that is moderate, Purulent drainage and blood      

      seeping from anus.  The area is tender to palpation..                                       

                                                                                                  

Vital Signs:                                                                                      

13:17  / 77; Pulse 55; Resp 18; Temp 98.1; Weight 186.88 kg; Height 5 ft. 11 in.        kr3 

      (180.34 cm); Pain 10/10;                                                                    

14:41  / 65; Pulse 52; Resp 18 S; Pulse Ox 100% on R/A; Pain 10/10;                     kc6 

15:58  / 67; Pulse 55; Resp 17 S; Pulse Ox 100% on R/A; Pain 3/10;                      kc6 

16:58  / 56; Pulse 54; Resp 18; Pulse Ox 100% on R/A;                                   kc6 

13:17 Body Mass Index 57.46 (186.88 kg, 180.34 cm)                                            kr3 

                                                                                                  

MDM:                                                                                              

13:36 Patient medically screened.                                                             [facility] 

14:15 ED course: Spoke to Dr. Singleton regarding CTs inability to complete the scan due to the [facility] 

      patient being over the weight limit (412lb). MRI stated this is well. Dr. Singleton           

      requested US to r/o perirectal abscess. .                                                   

15:30 ED course: Consulted with both Dr. Torre and Dr. Singleton regarding ultrasound        [facility] 

      results. Dr. Rubalcava advised to start Zosyn and stated that he would speak to the           

      radiologist about weight limit on CT scan..                                                 

16:50 Differential diagnosis: Perirectal abscess. Data reviewed: vital signs, nurses notes,   [facility] 

      lab test result(s), radiologic studies, ultrasound. Data interpreted: Pulse oximetry:       

      is 100 %. Interpretation: normal. Counseling: I had a detailed discussion with the          

      patient and/or guardian regarding: the historical points, exam findings, and any            

      diagnostic results supporting the discharge/admit diagnosis, the need for further           

      work-up and treatment in the hospital. ED course: The patient was admitted to Dr. Kuhn with Dr. Singleton as the consulting physician. Dr. Kuhn agreed to admission and     

      spoke with Mani regarding the plan of care. Mani ordered to continue Zosyn and        

      have the patient NPO after midnight..                                                       

                                                                                                  

                                                                                             

13:43 Order name: CBC with Diff; Complete Time: 15:12                                         [facility] 

                                                                                             

13:43 Order name: CMP; Complete Time: 15:12                                                   [facility] 

                                                                                             

17:47 Order name: SARS RAPID                                                                  eb  

                                                                                             

18:34 Order name: SARS-COV-2 Antigen Rapid                                                    [facility]

                                                                                             

21:05 Order name: Protime (+INR)                                                              [facility]

                                                                                             

21:06 Order name: Glucose, Ancillary Testing                                                  [facility]

                                                                                             

05:20 Order name: CBC with Automated Diff                                                     [facility]

                                                                                             

05:31 Order name: Basic Metabolic Panel                                                       [facility]

                                                                                             

05:31 Order name: Phosphorus                                                                  [facility]

                                                                                             

05:31 Order name: Magnesium                                                                   [facility]

                                                                                             

08:16 Order name: Glucose, Ancillary Testing                                                  [facility]

                                                                                             

11:11 Order name: Potassium                                                                   [facility]

                                                                                             

12:11 Order name: Glucose, Ancillary Testing                                                  [facility]

                                                                                             

13:43 Order name: IV Saline Lock; Complete Time: 14:11                                        [facility] 

                                                                                             

13:43 Order name: Labs collected and sent; Complete Time: 14:11                               [facility] 

                                                                                             

14:23 Order name: US Extrmty Nonvasular Limited; Complete Time: 15:19                         [facility] 

                                                                                             

16:47 Order name: NPO: NPO after midnight; Complete Time: 16:48                               [facility] 

                                                                                             

19:44 Order name: CPAP                                                                        [id number] 

                                                                                                  

Administered Medications:                                                                         

14:32 Drug: NS 0.9% 1000 ml Route: IV; Rate: 1 bolus; Site: right antecubital;                kc6 

15:32 Follow up: Response: No adverse reaction; IV Status: Completed infusion; IV Intake:     kc6 

      1000ml                                                                                      

15:13 Drug: fentaNYL (PF) 50 mcg Route: IVP; Site: right antecubital;                         kc6 

16:13 Follow up: Response: No adverse reaction; Pain is decreased                             kc6 

15:13 Drug: Zofran (Ondansetron) 4 mg Route: IVP; Site: right antecubital;                    kc6 

16:13 Follow up: Response: No adverse reaction; Nausea is decreased                           6 

15:57 Drug: Zosyn (piperacillin-tazobactam) 3.375 grams Route: IVPB; Infused Over: 60 mins;   kc6 

      Site: right antecubital;                                                                    

16:57 Follow up: Response: No adverse reaction; IV Status: Completed infusion; IV Intake:     kc6 

      100ml                                                                                       

                                                                                                  

                                                                                                  

Disposition Summary:                                                                              

22 16:44                                                                                    

Hospitalization Ordered                                                                           

      Hospitalization Status: Inpatient Admission                                             [facility] 

      Provider: Sushant Kuhn                                                                [facility] 

      Condition: Stable                                                                       [facility] 

      Problem: new                                                                            [facility] 

      Symptoms: are unchanged                                                                 [facility] 

      Bed/Room Type: Standard                                                                 [facility] 

      Location: Telemetry/MedSurg (observation)(22 12:32)                                 

      Room Assignment: [facility](22 12:32)                                                      

      Diagnosis                                                                                   

        - Rectal abscess                                                                      [facility] 

      Forms:                                                                                      

        - Medication Reconciliation Form                                                      [facility] 

        - SBAR form                                                                           [facility] 

Addendum:                                                                                         

2022                                                                                        

     13:32 Co-signature as Attending Physician, Lukas Torre MD I agree with the assessment and  c
ha

           plan of care.                                                                          

                                                                                                  

Signatures:                                                                                       

Dispatcher [employer]                           [facility]                                                 

Ruba Walls RN                        Lukas Chaves MD MD cha Garcia, Cindy, RN                       RN   Yesy Hyatt, KARINEP                   FNP  [facility]                                                  

Maya Richey RN                        RN   [id number]                                                  

Elma Ni RN                   RN   [id number]                                                  

                                                                                                  

Corrections: (The following items were deleted from the chart)                                    

                                                                                             

14:49 13:43 Abdomen Pelvis W Con+CT.RAD.BRZ ordered. [facility]

15:52 13:20 Abdomen/GI: Inspection: abdomen appears normal, Bowel sounds: normal, Palpation:  [facility] 

      abdomen is soft and non-tender, Rectal exam: tenderness, that is moderate, Purulent         

      drainage and blood seeping from anus. The area is tender to palpation.. [facility]                 

 16:44 Telemetry/MedSurg (Inpatient) [facility]  

 16:44 [facility]  

                                                                                             

12:32  21:43 [facility]  

                                                                                             

12:32  21:43 [facility]  

                                                                                                  

**************************************************************************************************

## 2022-11-17 NOTE — P.HP
Certification for Inpatient


With expected LOS: <2 Midnights


Practitioner: I am a practitioner with admitting privileges, knowledge of 

patient current condition, hospital course, and medical plan of care.


Services: Services provided to patient in accordance with Admission requirements

found in Title 42 Section 412.3 of the Code of Federal Regulations





Patient History


Date of Service: 11/17/22


Reason for admission: Drainage from anal area


History of Present Illness: 


52-year-old morbidly obese gentleman with a history of diabetes mellitus type II

presented emergency department with a complaint of purulent drainage and 

bleeding from the anal.  Patient was referred to Dr. Riley by his PCP, he saw 

Dr. Riley in his office today who examined him and noted an opening in the 

perirectal area draining bloodstained pus.  Dr. Riley referred the patient to 

the emergency department for imaging.  Patient weight could not fit the CT scan.

 Ultrasound was done which shows small perirectal abscess.  Dr. Riley 

recommended hospitalization for examination under anesthesia tomorrow.  Patient 

is admitted for further management.








- Past Medical/Surgical History


-: Diabetes mellitus type 2


-: Morbid obesity





- Family History


  ** Father


-: Diabetes





- Social History


Smoking Status: Never smoker


Alcohol use: No


CD- Drugs: No


Place of Residence: Home





Review of Systems


Other: 


Patient denied any fever or chills.


No nausea or vomiting.





Except as documented, all other systems reviewed and negative.








Physical Examination





- Physical Exam


General: Alert, In no apparent distress, Oriented x3, Other (Morbidly obese)


HEENT: Atraumatic, PERRLA, Mucous membr. moist/pink


Neck: Supple, JVD not distended


Respiratory: Clear to auscultation bilaterally, Normal air movement


Cardiovascular: No edema, Regular rate/rhythm, Normal S1 S2


Gastrointestinal: Normal bowel sounds, Soft and benign, Non-distended, No 

tenderness, Other (Small opening in the perirectal area draining blood stained 

pus.  No tender swelling.)


Musculoskeletal: No swelling, No tenderness


Integumentary: No rashes, No erythema, No cyanosis


Neurological: Normal speech, Normal strength at 5/5 x4 extr, Cranial nerves 3-12

intact


Lymphatics: No axilla or inguinal lymphadenopathy





- Studies


Laboratory Data (last 24 hrs)





11/17/22 14:21: Sodium 139, Potassium 3.8, BUN 9, Creatinine 0.83, Glucose 100, 

Total Bilirubin 0.5, AST 17, ALT 31, Alkaline Phosphatase 65


11/17/22 14:21: WBC 7.50, Hgb 14.5, Hct 42.6, Plt Count 217








Assessment and Plan





- Problems (Diagnosis)


(1) Perirectal abscess


Current Visit: Yes   Status: Acute   





(2) Diabetes mellitus type 2 in obese


Current Visit: Yes   Status: Acute   





(3) Morbid obesity


Current Visit: Yes   Status: Acute   





- Plan


Place patient on observation on the medical floor.


Start IV Zosyn


Pain management with IV fentanyl as needed


Insulin sliding scale for glucose


General surgery-Dr. Riley consulted.


Dr. Riley is planning examination under general anesthesia.








- Advance Directives


Does patient have a Living Will: No


Does patient have a Durable POA for Healthcare: No

## 2022-11-18 VITALS — OXYGEN SATURATION: 100 %

## 2022-11-18 LAB
BUN BLD-MCNC: 8 MG/DL (ref 7–18)
GLUCOSE SERPLBLD-MCNC: 95 MG/DL (ref 74–106)
HCT VFR BLD CALC: 39.7 % (ref 39.6–49)
LYMPHOCYTES # SPEC AUTO: 1.6 K/UL (ref 0.7–4.9)
MAGNESIUM SERPL-MCNC: 2.2 MG/DL (ref 1.8–2.4)
MCV RBC: 82.1 FL (ref 80–100)
PMV BLD: 9 FL (ref 7.6–11.3)
POTASSIUM SERPL-SCNC: 3.6 MMOL/L (ref 3.5–5.1)
RBC # BLD: 4.84 M/UL (ref 4.33–5.43)

## 2022-11-18 PROCEDURE — 0WHP7YZ INSERTION OF OTHER DEVICE INTO GASTROINTESTINAL TRACT, VIA NATURAL OR ARTIFICIAL OPENING: ICD-10-PCS

## 2022-11-18 RX ADMIN — FENTANYL CITRATE PRN MCG: 50 INJECTION, SOLUTION INTRAMUSCULAR; INTRAVENOUS at 21:18

## 2022-11-18 RX ADMIN — HYDROMORPHONE HYDROCHLORIDE ONE MG: 1 INJECTION, SOLUTION INTRAMUSCULAR; INTRAVENOUS; SUBCUTANEOUS at 16:31

## 2022-11-18 RX ADMIN — HYDROMORPHONE HYDROCHLORIDE ONE MG: 1 INJECTION, SOLUTION INTRAMUSCULAR; INTRAVENOUS; SUBCUTANEOUS at 16:46

## 2022-11-18 RX ADMIN — FLECAINIDE ACETATE SCH MG: 100 TABLET ORAL at 20:58

## 2022-11-18 RX ADMIN — HUMAN INSULIN SCH: 100 INJECTION, SOLUTION SUBCUTANEOUS at 20:58

## 2022-11-18 RX ADMIN — SODIUM CHLORIDE SCH: 9 INJECTION, SOLUTION INTRAVENOUS at 15:00

## 2022-11-18 RX ADMIN — Medication SCH ML: at 09:00

## 2022-11-18 RX ADMIN — HYDROMORPHONE HYDROCHLORIDE ONE MG: 1 INJECTION, SOLUTION INTRAMUSCULAR; INTRAVENOUS; SUBCUTANEOUS at 17:10

## 2022-11-18 RX ADMIN — FENTANYL CITRATE PRN MCG: 50 INJECTION, SOLUTION INTRAMUSCULAR; INTRAVENOUS at 04:45

## 2022-11-18 RX ADMIN — HYDROCODONE BITARTRATE AND ACETAMINOPHEN PRN TAB: 7.5; 325 TABLET ORAL at 23:22

## 2022-11-18 RX ADMIN — HUMAN INSULIN SCH: 100 INJECTION, SOLUTION SUBCUTANEOUS at 07:30

## 2022-11-18 RX ADMIN — HYDROMORPHONE HYDROCHLORIDE ONE MG: 1 INJECTION, SOLUTION INTRAMUSCULAR; INTRAVENOUS; SUBCUTANEOUS at 16:59

## 2022-11-18 RX ADMIN — HYDROCODONE BITARTRATE AND ACETAMINOPHEN PRN TAB: 7.5; 325 TABLET ORAL at 17:44

## 2022-11-18 RX ADMIN — HUMAN INSULIN SCH: 100 INJECTION, SOLUTION SUBCUTANEOUS at 16:30

## 2022-11-18 RX ADMIN — FENTANYL CITRATE ONE MCG: 50 INJECTION, SOLUTION INTRAMUSCULAR; INTRAVENOUS at 16:18

## 2022-11-18 RX ADMIN — FAMOTIDINE SCH: 20 TABLET, FILM COATED ORAL at 09:00

## 2022-11-18 RX ADMIN — Medication SCH: at 09:00

## 2022-11-18 RX ADMIN — Medication SCH ML: at 20:58

## 2022-11-18 RX ADMIN — FLECAINIDE ACETATE SCH: 100 TABLET ORAL at 09:00

## 2022-11-18 RX ADMIN — FAMOTIDINE SCH MG: 20 TABLET, FILM COATED ORAL at 20:57

## 2022-11-18 RX ADMIN — FENTANYL CITRATE ONE MCG: 50 INJECTION, SOLUTION INTRAMUSCULAR; INTRAVENOUS at 16:12

## 2022-11-18 RX ADMIN — SODIUM CHLORIDE SCH MLS: 0.9 INJECTION, SOLUTION INTRAVENOUS at 05:15

## 2022-11-18 RX ADMIN — PIOGLITAZONE SCH: 15 TABLET ORAL at 09:00

## 2022-11-18 RX ADMIN — SODIUM CHLORIDE SCH MLS: 9 INJECTION, SOLUTION INTRAVENOUS at 23:23

## 2022-11-18 RX ADMIN — HUMAN INSULIN SCH: 100 INJECTION, SOLUTION SUBCUTANEOUS at 11:30

## 2022-11-18 RX ADMIN — FENTANYL CITRATE PRN MCG: 50 INJECTION, SOLUTION INTRAMUSCULAR; INTRAVENOUS at 12:05

## 2022-11-18 RX ADMIN — HYDROMORPHONE HYDROCHLORIDE ONE MG: 1 INJECTION, SOLUTION INTRAMUSCULAR; INTRAVENOUS; SUBCUTANEOUS at 16:36

## 2022-11-18 RX ADMIN — SODIUM CHLORIDE SCH MLS: 9 INJECTION, SOLUTION INTRAVENOUS at 07:00

## 2022-11-18 RX ADMIN — HYDROMORPHONE HYDROCHLORIDE ONE MG: 1 INJECTION, SOLUTION INTRAMUSCULAR; INTRAVENOUS; SUBCUTANEOUS at 16:26

## 2022-11-18 RX ADMIN — SODIUM CHLORIDE SCH MLS: 0.9 INJECTION, SOLUTION INTRAVENOUS at 21:19

## 2022-11-18 NOTE — CON
Date of Consultation:  11/17/2022



Brief History Of Present Illness:  The patient is a 52-year-old male, who I saw earlier in the day in
 my clinic, who came with complaints of 8 weeks of approximately perianal pain, which now has progres
sed to drainage and pus type discharge.  His pain got significantly worse.  He has severe sharp pain,
 which is significantly worse after bowel movements and associated with blood and he could no longer 
sit down.  He has been dealing with this for approximately 8 weeks.  He was concerned that he had a f
issure per his primary medical doctor's concern.  As such, he came to the office, at which point, I r
ecommended he go to the ER for proper imaging.  We attempted to get him authorized for imaging as an 
outpatient.  However, his insurance company was unable to be reached.  Fortunately, he was willing to
 go to the emergency room as he had significant worsening of symptoms and as such, he went to the [facility] room with the above-stated complaints.



Past Medical History:  Significant for diabetes, morbid obesity.



Family History:  History for diabetes.



Social History:  He denies smoking, alcohol, recreational drug use.



Review of Systems:

Ten-point review of systems other than HPI, denies.



Physical Examination:

General:  At the time of my examination, he is awake, alert, oriented. 

Psychiatric:  Appropriate, conversive. 

General:  He is morbidly obese, awake, approximately 400 pounds. 

HEENT:  Normocephalic.  Sclerae anicteric.  Mucous membranes are moist.  Oropharynx is clear. 

Neck:  Supple.  No JVD. 

Chest:  Normal expansion and excursion. 

Heart:  Regular rate and rhythm. 

Pulmonary:  Clear to auscultation bilaterally. 

Abdomen:  Soft, nontender, nondistended.  No rebound.  No guarding.  No focal peritonitis. 

Rectal:  Focused examination of the rectal area shows a perineal area of drainage of pus and purulent
 type material along with some minimal blood.  Rectal exam could not be fully performed either in my 
clinic or in the emergency room due to significant pain and inability to tolerate rectal examination.
 

SKIN:  Otherwise warm and dry.



Laboratory Data:  He had a laboratory exam, which revealed a white blood cell count of 6.2, hemoglobi
n 13.3, hematocrit 39.7, platelet count is 186.  His sodium 140, potassium 3.7, chloride 110, carbon 
dioxide is 26, BUN 8, creatinine 0.8, glucose is 95, magnesium was 2.2, his phosphorous was 3.4.  CT 
scan is currently pending.  He had an ultrasound performed, which was officially read on 11/17 as sub
cutaneous collection likely reflecting subcentimeter abscess in perineum, perianal, perirectal fistul
a cannot be excluded on the basis of this study.



Assessment And Plan:  This is a 52-year-old male who comes in with perineal abscess, possible perirec
miladys abscess.

1.IV fluid hydration.

2.Antibiotic coverage.

3.Await CT scan.

4.I have explained risks, benefits, alternatives of exam under anesthesia, drainage of perianal, per
irectal abscess, including but not limited to bleeding, infection, damage to surrounding tissues, tro
uble with sphincter function, nerve issues, and ongoing need for surgery.  Patient agrees to proceed 
as indicated.





JING/EREN

DD:  11/18/2022 13:48:13Voice ID:  [id number]

DT:  11/18/2022 20:57:19Report ID:  [id number]

## 2022-11-18 NOTE — P.OP
Preoperative diagnosis: Perianal Pain


Postoperative diagnosis: Perianal Pain


Primary procedure: Exam Under Anesthesia, placement of Seton


Anesthesia: GETA + Local


Estimated blood loss: < 10cc


Specimen: none


Findings: perianal fistula @ 6 oclock position in lithotomy


Complications: None


Implants: Seton - red vessel loop


Transferred to: Recovery Room


Condition: Good

## 2022-11-18 NOTE — P.PN
Subjective


Date of Service: 11/18/22


Chief Complaint: Drainage from anal area


Patient reports intermittent pain in the rectal area.


No issues overnight.


He has been afebrile.








Physical Examination





- Vital Signs


Temperature: 97.2 F


Blood Pressure: 135/69


Pulse: 59


Respirations: 20


Pulse Ox (%): 100





- Studies


Laboratory Data (last 24 hrs)





11/17/22 14:21: Sodium 139, Potassium 3.8, BUN 9, Creatinine 0.83, Glucose 100, 

Total Bilirubin 0.5, AST 17, ALT 31, Alkaline Phosphatase 65


11/17/22 14:21: WBC 7.50, Hgb 14.5, Hct 42.6, Plt Count 217








Assessment And Plan





- Current Problems (Diagnosis)


(1) Perirectal abscess


Current Visit: Yes   Status: Acute   





(2) Diabetes mellitus type 2 in obese


Current Visit: Yes   Status: Acute   





(3) Morbid obesity


Current Visit: Yes   Status: Acute   





- Plan


Physical Exam


General: Alert, In no apparent distress, Oriented x3.


Neck: Supple, JVD not distended


Respiratory: Clear to auscultation bilaterally, Normal air movement


Cardiovascular: No edema, Regular rate/rhythm, Normal S1 S2


Gastrointestinal: Normal bowel sounds, Soft and benign, Non-distended, No 

tenderness, Small opening in the perirectal area draining blood stained pus. 


Musculoskeletal: No swelling, No tenderness


Integumentary: No rashes, No erythema, No cyanosis


Neurological: Normal speech, Normal strength at 5/5 x4 extr, Cranial nerves 3-12

intact





Plan:


Continue IV Zosyn


Continue IV fentanyl as needed for pain.


Seen by Dr. Riley who is recommending CT pelvis before examination under 

anesthesia.


CT pelvis ordered.


Blood glucose within normal range.


Insulin sliding scale for glucose

## 2022-11-18 NOTE — RAD REPORT
EXAM DESCRIPTION:  CT - Pelvis W/Cont - 11/18/2022 2:37 pm

 

CLINICAL HISTORY:  Rectal abscess

 

COMPARISON:  No comparisons

 

FINDINGS:  CT scan of the pelvis was obtained with contrast. No perianal or perirectal abscess is ham[place]
ntified. No osseus abnormalities identified. No retroperitoneal lymphadenopathy. No soft tissue gas.

 

IMPRESSION:  No evidence of a perirectal or perianal abscess.

## 2022-11-19 VITALS — SYSTOLIC BLOOD PRESSURE: 124 MMHG | TEMPERATURE: 96.9 F | DIASTOLIC BLOOD PRESSURE: 67 MMHG

## 2022-11-19 RX ADMIN — Medication SCH: at 07:36

## 2022-11-19 RX ADMIN — HYDROCODONE BITARTRATE AND ACETAMINOPHEN PRN TAB: 7.5; 325 TABLET ORAL at 07:35

## 2022-11-19 RX ADMIN — PIOGLITAZONE SCH MG: 15 TABLET ORAL at 07:35

## 2022-11-19 RX ADMIN — SODIUM CHLORIDE SCH MLS: 9 INJECTION, SOLUTION INTRAVENOUS at 07:36

## 2022-11-19 RX ADMIN — FENTANYL CITRATE PRN MCG: 50 INJECTION, SOLUTION INTRAMUSCULAR; INTRAVENOUS at 03:31

## 2022-11-19 RX ADMIN — Medication SCH: at 07:45

## 2022-11-19 RX ADMIN — HUMAN INSULIN SCH: 100 INJECTION, SOLUTION SUBCUTANEOUS at 07:30

## 2022-11-19 RX ADMIN — FLECAINIDE ACETATE SCH MG: 100 TABLET ORAL at 07:36

## 2022-11-19 RX ADMIN — SODIUM CHLORIDE SCH MLS: 9 INJECTION, SOLUTION INTRAVENOUS at 06:19

## 2022-11-19 RX ADMIN — FAMOTIDINE SCH MG: 20 TABLET, FILM COATED ORAL at 07:35

## 2022-11-19 NOTE — P.DS
Admission Date: 11/17/22


Discharge Date: 11/19/22


Reason for Admission: Drainage from anal area





- Problems


(1) Rectal fistula


Current Visit: Yes   Status: Acute   





(2) Diabetes mellitus type 2 in obese


Current Visit: Yes   Status: Acute   





(3) Morbid obesity


Current Visit: Yes   Status: Acute   


Brief History of Present Illness: 


52-year-old morbidly obese gentleman with a history of diabetes mellitus type II

presented emergency department with a complaint of purulent drainage and 

bleeding from the anal.  Patient was referred to Dr. Riley by his PCP, he saw 

Dr. Riley in his office today who examined him and noted an opening in the 

perirectal area draining bloodstained pus.  Dr. Riley referred the patient to 

the emergency department for imaging.  Patient weight could not fit the CT scan.

 Ultrasound was done which shows small perirectal abscess.  Dr. Riley recomm

ended hospitalization for examination under anesthesia tomorrow.  Patient 

hospitalized for further management.





Hospital Course: 


Patient admitted to the medical floor and treated with IV Zosyn.  Pelvic CT with

IV contrast was done which showed no evidence of perirectal or perianal abscess.

 Patient seen by general surgery Dr. Riley who performed examination under 

anesthesia and noted a fistula and placed a seton.  Patient deemed stable for 

discharge per Dr. Riley.


He is prescribed empiric ciprofloxacin and Flagyl.


He is informed to follow-up with Dr. Riley as outpatient.





Vital Signs/Physical Exam: 














Temp Pulse Resp BP Pulse Ox


 


 96.9 F   58   18   124/67   95 


 


 11/19/22 08:00  11/19/22 08:00  11/19/22 08:35  11/19/22 08:00  11/19/22 08:35








General: Alert, In no apparent distress, Oriented x3, Obese


HEENT: Mucous membr. moist/pink


Respiratory: Clear to auscultation bilaterally, Normal air movement


Cardiovascular: Regular rate/rhythm, Normal S1 S2


Gastrointestinal: Soft and benign


Musculoskeletal: No erythema, No tenderness


Integumentary: No rashes


Neurological: Normal strength at 5/5 x4 extr


Laboratory Data at Discharge: 














WBC  6.20 K/uL (4.3-10.9)   11/18/22  04:05    


 


Hgb  13.3 g/dL (13.6-17.9)  L D 11/18/22  04:05    


 


Hct  39.7 % (39.6-49.0)   11/18/22  04:05    


 


Plt Count  186 K/uL (152-406)   11/18/22  04:05    


 


PT  12.6 SECONDS (9.5-12.5)  H  11/17/22  20:34    


 


INR  1.15   11/17/22  20:34    


 


Sodium  140 mmol/L (136-145)   11/18/22  04:05    


 


Potassium  3.7 mmol/L (3.5-5.1)   11/18/22  07:46    


 


BUN  8 mg/dL (7-18)   11/18/22  04:05    


 


Creatinine  0.86 mg/dL (0.55-1.3)   11/18/22  04:05    


 


Glucose  95 mg/dL ()   11/18/22  04:05    


 


Phosphorus  3.4 mg/dL (2.5-4.9)   11/18/22  04:05    


 


Magnesium  2.2 mg/dL (1.8-2.4)   11/18/22  04:05    


 


Total Bilirubin  0.5 mg/dL (0.2-1.0)   11/17/22  14:21    


 


AST  17 U/L (15-37)   11/17/22  14:21    


 


ALT  31 U/L (12-78)   11/17/22  14:21    


 


Alkaline Phosphatase  65 U/L ()   11/17/22  14:21    








Home Medications: 








Allopurinol 300 mg PO DAILY 11/17/22 


Aspirin [Vazalore] 81 mg PO DAILY 11/17/22 


Famotidine 20 mg PO BID 11/17/22 


Flecainide [Tambocor*] 100 mg PO BID 11/17/22 


Metformin HCl 500 mg PO BID 11/17/22 


Pioglitazone HCl [Actos] 15 mg PO DAILY 11/17/22 


Potassium Citrate [Potassium Citrate ER]  11/17/22 


Rosuvastatin Calcium 5 mg PO DAILY 11/17/22 


Semaglutide [Ozempic] 1 mg SQ EVERY 7TH DAY 11/17/22 


Sildenafil Citrate 100 mg PO PRN 11/17/22 


Ciprofloxacin HCl [Cipro] 500 mg PO BID #20 tab 11/19/22 


Hydrocodone 7.5/APAP 325 [Norco 7.5/325 mg*] 1 tab PO Q6H PRN #20 tab 11/19/22 


metroNIDAZOLE [Flagyl] 500 mg PO Q8H #30 tab 11/19/22 





New Medications: 


Ciprofloxacin HCl [Cipro] 500 mg PO BID #20 tab


metroNIDAZOLE [Flagyl] 500 mg PO Q8H #30 tab


Hydrocodone 7.5/APAP 325 [Norco 7.5/325 mg*] 1 tab PO Q6H PRN #20 tab


 PRN Reason: Pain Scale 5-7 (Moderate)


Activity: Ad honorio


Followup: 


Nacho Riley MD [ACTIVE - CAN ADMIT] - 1-2 Weeks


Unknown,U [Primary Care Provider] -

## 2022-11-19 NOTE — P.DS
Admission Date: 11/17/22


Discharge Date: 11/19/22


Reason for Admission: Drainage from anal area





- Problems


(1) Perirectal abscess


Current Visit: Yes   Status: Acute   





(2) Diabetes mellitus type 2 in obese


Current Visit: Yes   Status: Acute   





(3) Morbid obesity


Current Visit: Yes   Status: Acute   


Brief History of Present Illness: 


52-year-old morbidly obese gentleman with a history of diabetes mellitus type II

presented emergency department with a complaint of purulent drainage and 

bleeding from the anal.  Patient was referred to Dr. Riley by his PCP, he saw 

Dr. Riley in his office today who examined him and noted an opening in the 

perirectal area draining bloodstained pus.  Dr. Riley referred the patient to 

the emergency department for imaging.  Patient weight could not fit the CT scan.

 Ultrasound was done which shows small perirectal abscess.  Dr. Riley re

commended hospitalization for examination under anesthesia tomorrow.  Patient is

admitted for further management.





Vital Signs/Physical Exam: 














Temp Pulse Resp BP Pulse Ox


 


 96.9 F   58   18   124/67   98 


 


 11/19/22 08:00  11/19/22 08:00  11/19/22 08:00  11/19/22 08:00  11/19/22 08:00








Laboratory Data at Discharge: 














WBC  6.20 K/uL (4.3-10.9)   11/18/22  04:05    


 


Hgb  13.3 g/dL (13.6-17.9)  L D 11/18/22  04:05    


 


Hct  39.7 % (39.6-49.0)   11/18/22  04:05    


 


Plt Count  186 K/uL (152-406)   11/18/22  04:05    


 


PT  12.6 SECONDS (9.5-12.5)  H  11/17/22  20:34    


 


INR  1.15   11/17/22  20:34    


 


Sodium  140 mmol/L (136-145)   11/18/22  04:05    


 


Potassium  3.7 mmol/L (3.5-5.1)   11/18/22  07:46    


 


BUN  8 mg/dL (7-18)   11/18/22  04:05    


 


Creatinine  0.86 mg/dL (0.55-1.3)   11/18/22  04:05    


 


Glucose  95 mg/dL ()   11/18/22  04:05    


 


Phosphorus  3.4 mg/dL (2.5-4.9)   11/18/22  04:05    


 


Magnesium  2.2 mg/dL (1.8-2.4)   11/18/22  04:05    


 


Total Bilirubin  0.5 mg/dL (0.2-1.0)   11/17/22  14:21    


 


AST  17 U/L (15-37)   11/17/22  14:21    


 


ALT  31 U/L (12-78)   11/17/22  14:21    


 


Alkaline Phosphatase  65 U/L ()   11/17/22  14:21    








Home Medications: 








Allopurinol 300 mg PO DAILY 11/17/22 


Aspirin [Vazalore] 81 mg PO DAILY 11/17/22 


Famotidine 20 mg PO BID 11/17/22 


Flecainide [Tambocor*] 100 mg PO BID 11/17/22 


Metformin HCl 500 mg PO BID 11/17/22 


Pioglitazone HCl [Actos] 15 mg PO DAILY 11/17/22 


Potassium Citrate [Potassium Citrate ER]  11/17/22 


Rosuvastatin Calcium 5 mg PO DAILY 11/17/22 


Semaglutide [Ozempic] 1 mg SQ EVERY 7TH DAY 11/17/22 


Sildenafil Citrate 100 mg PO PRN 11/17/22 


Ciprofloxacin HCl [Cipro] 500 mg PO BID #20 tab 11/19/22 


Hydrocodone 7.5/APAP 325 [Norco 7.5/325 mg*] 1 tab PO Q6H PRN #20 tab 11/19/22 


metroNIDAZOLE [Flagyl] 500 mg PO Q8H #30 tab 11/19/22 





New Medications: 


Ciprofloxacin HCl [Cipro] 500 mg PO BID #20 tab


metroNIDAZOLE [Flagyl] 500 mg PO Q8H #30 tab


Hydrocodone 7.5/APAP 325 [Norco 7.5/325 mg*] 1 tab PO Q6H PRN #20 tab


 PRN Reason: Pain Scale 5-7 (Moderate)


Activity: Ad honorio


Followup: 


Nacho Riley MD [ACTIVE - CAN ADMIT] - 1-2 Weeks


Unknown,U [Primary Care Provider] -

## 2022-11-19 NOTE — OP
Date of Procedure:  11/18/2022



Surgeon:  Nacho Riley MD, MD



Preoperative Diagnosis:  Perianal pain.



Postoperative Diagnosis:  Perianal pain.



Procedures Performed:  

1.Exam under anesthesia.

2.Placement of a cutting seton.



Anesthesia:  General endotracheal plus local with 0.25% Marcaine.



Estimated Blood Loss:  10 cc.



Specimen:  None.



Findings:  Perianal fistula at the 6 o'clock position while the patient was in the lithotomy position
.



Complications:  None.



Implants:  

1.Seton red vessel loop.

2.Gel-Foam placed and packed in the perianal area.



Disposition:  The patient transferred to recovery room in good condition.



Procedure In Detail:  After informed consent was obtained, patient was brought to the operating room,
 prepped and draped in the usual sterile fashion.  After adequate anesthesia was achieved, a proctosc
ope was inserted into the anus after appropriate lubricating and digital rectal examination at which 
an area of thickening was found at 6 o'clock position.  I visualized this area with a proctoscope and
 found to be consistent with a bleeding possible fistula with abscess material emanating from the mariia
tral portion of the anterior rectal space.  At this point, I removed the proctoscope and placed a seq
uentially larger anoscope into the anal canal.  I then digitized an area of the perianal skin and fou
nd there to be an opening at the 6 o'clock position in the midline consistent with a direct perianal 
fistula.  I then probed this with a probe and easily found a tract into the perianal canal.  At this 
point, I placed a red rubber vessel loop through the probe and advanced through the fistula and secur
ed using a 0 silk suture.  The area was copiously irrigated until completely clear.  There was stool 
burden in the general vicinity.  However, I irrigated as much as possible and checked at the area.  N
o additional abscesses were palpated circumferentially around the perineal and perianal areas.  I the
n placed a piece of soaked Gelfoam into the anal canal to assist with additional hemostasis.  No hemo
stat is required.  Additionally, significant hemorrhoid burn was noted throughout all 3 pillars, but 
they did not have evidence of active bleeding or sequelae of thrombosis at this time and as such, I o
pted to leave them in the anatomic position, which appeared to be level grade 2 internal hemorrhoids 
at this point.  The sterile dressing was then placed over top.  The patient tolerated the procedure w
ell without evidence of complication and transferred to PACU in good condition.  All counts were clint
ect at the end of the case.





JING/EREN

DD:  11/18/2022 16:10:07Voice ID:  [id number]

DT:  11/19/2022 03:30:30Report ID:  [id number]

## 2023-01-26 ENCOUNTER — HOSPITAL ENCOUNTER (OUTPATIENT)
Dept: HOSPITAL 97 - OT | Age: 53
Discharge: HOME | End: 2023-01-26
Attending: SURGERY
Payer: COMMERCIAL

## 2023-01-26 VITALS — DIASTOLIC BLOOD PRESSURE: 65 MMHG | OXYGEN SATURATION: 100 % | SYSTOLIC BLOOD PRESSURE: 114 MMHG

## 2023-01-26 VITALS — TEMPERATURE: 97.1 F

## 2023-01-26 DIAGNOSIS — K60.3: Primary | ICD-10-CM

## 2023-01-26 LAB
BUN BLD-MCNC: 11 MG/DL (ref 7–18)
GLUCOSE SERPLBLD-MCNC: 124 MG/DL (ref 74–106)
POTASSIUM SERPL-SCNC: 3.9 MMOL/L (ref 3.5–5.1)

## 2023-01-26 PROCEDURE — 82947 ASSAY GLUCOSE BLOOD QUANT: CPT

## 2023-01-26 PROCEDURE — 93005 ELECTROCARDIOGRAM TRACING: CPT

## 2023-01-26 PROCEDURE — 36415 COLL VENOUS BLD VENIPUNCTURE: CPT

## 2023-01-26 PROCEDURE — 80048 BASIC METABOLIC PNL TOTAL CA: CPT

## 2023-01-26 PROCEDURE — 0HB9XZZ EXCISION OF PERINEUM SKIN, EXTERNAL APPROACH: ICD-10-PCS

## 2023-01-26 PROCEDURE — 46707 REPAIR ANORECTAL FIST W/PLUG: CPT

## 2023-01-26 RX ADMIN — HYDROMORPHONE HYDROCHLORIDE ONE MG: 1 INJECTION, SOLUTION INTRAMUSCULAR; INTRAVENOUS; SUBCUTANEOUS at 12:32

## 2023-01-26 RX ADMIN — CEFAZOLIN ONE GM: 2 INJECTION, POWDER, FOR SOLUTION INTRAMUSCULAR; INTRAVENOUS at 11:12

## 2023-01-26 RX ADMIN — CEFAZOLIN ONE GM: 2 INJECTION, POWDER, FOR SOLUTION INTRAMUSCULAR; INTRAVENOUS at 11:20

## 2023-01-26 RX ADMIN — HYDROMORPHONE HYDROCHLORIDE ONE MG: 1 INJECTION, SOLUTION INTRAMUSCULAR; INTRAVENOUS; SUBCUTANEOUS at 12:37

## 2023-01-26 RX ADMIN — HYDROMORPHONE HYDROCHLORIDE ONE MG: 1 INJECTION, SOLUTION INTRAMUSCULAR; INTRAVENOUS; SUBCUTANEOUS at 12:42

## 2023-01-26 RX ADMIN — HYDROMORPHONE HYDROCHLORIDE ONE MG: 1 INJECTION, SOLUTION INTRAMUSCULAR; INTRAVENOUS; SUBCUTANEOUS at 12:27

## 2023-01-26 NOTE — P.OP
Preoperative diagnosis: Persistent Perianal Fistula - posterior


Postoperative diagnosis: Persistent Perianal Fistula - posterior


Primary procedure: Exam under anesthesia


Secondary procedure: Placement of Porcine Fistula plug


Anesthesia: GETA + Local


Estimated blood loss: <5cc


Specimen: none


Findings: posterior perianal fistula


Complications: None


Implants: Cook 5 Porcine Fistula plug


Transferred to: Recovery Room


Condition: Good

## 2023-01-26 NOTE — EKG
Test Date:    2023-01-26               Test Time:    08:18:10

Technician:   ABE                                   

                                                     

MEASUREMENT RESULTS:                                       

Intervals:                                           

Rate:         55                                     

NV:           180                                    

QRSD:         100                                    

QT:           466                                    

QTc:          445                                    

Axis:                                                

P:            11                                     

NV:           180                                    

QRS:          -20                                    

T:            -8                                     

                                                     

INTERPRETIVE STATEMENTS:                                       

                                                     

Sinus bradycardia

Moderate voltage criteria for LVH, may be normal variant

Borderline ECG

No previous ECG available for comparison



Electronically Signed On 01-26-23 18:10:18 CST by Shun Delgado

## 2023-01-26 NOTE — OP
Date of Procedure:  01/26/2023



Surgeon:  Nacho Riley MD, MD



Preoperative Diagnosis:  Persistent perianal fistula in the posterior position.



Postoperative Diagnosis:  Persistent perianal fistula in the posterior position.



Procedure Performed:  

1.Exam under anesthesia.

2.Placement of Porcine fistula plug.



Anesthesia:  General endotracheal plus local with 0.25% Marcaine.



Estimated Blood Loss:  Less than 5 cc.



Specimen:  None.



Findings:  Posterior perianal fistula.



Complications:  None.



Implants:  Cook #5 Porcine fistula plug.



Disposition:  The patient was transferred to the recovery room in good condition.



Procedure In Detail:  After informed consent was obtained, the patient was brought to the operating r
oom, prepped and draped in the usual sterile fashion after adequate anesthesia was achieved.  I perfo
rmed exam under anesthesia.  The patient remained in lithotomy position throughout the procedure.  At
 this point, I did sequential dilation of anoscopy to ultimately examine a posterior fistula, which w
as at the 6 o'clock position.  The seton remained in place at this point.  I then cannulated the trac
t with a catheter and flushed it with hydrogen peroxide, 10 cc were utilized.  At this point, I passe
d the brush through and scrubbed the fistula tract removing the seton at this time and left this scru
bbing plugged passer in place at this point securing the 5 Cook Porcine plug on the anterior aspect. 
 I then passed it through leaving the button on the internal aspect of the rectal wall.  I secured th
e anal fistula plug to the tissue using PDS sutures in a circumferential fashion.  Four sutures were 
used and good apposition of the plug to the tissue was appreciated at this point.  I digitized and fo
und to be in good apposition with good hemostasis as well.  I injected the area with additional local
.  I then trimmed the fistula plug and secured an additional 0 Vicryl suture through the distal aspec
t of the plug securing it to the perianal skin to help secure this during the immediate postop.  The 
patient tolerated the procedure well without evidence of complication.  I then placed a Gelfoam into 
the perianal canal.  The patient tolerated the procedure well without evidence of complication and tr
ansferred to PACU in good condition.  All counts were correct at the end of the case.





TK/MODL

DD:  01/26/2023 12:02:48Voice ID:  [id number]

DT:  01/26/2023 12:24:12Report ID:  [id number]